# Patient Record
Sex: FEMALE | Race: WHITE | NOT HISPANIC OR LATINO | URBAN - METROPOLITAN AREA
[De-identification: names, ages, dates, MRNs, and addresses within clinical notes are randomized per-mention and may not be internally consistent; named-entity substitution may affect disease eponyms.]

---

## 2023-01-28 ENCOUNTER — OFFICE VISIT (OUTPATIENT)
Dept: URGENT CARE | Facility: CLINIC | Age: 48
End: 2023-01-28
Payer: COMMERCIAL

## 2023-01-28 VITALS
BODY MASS INDEX: 45 KG/M2 | OXYGEN SATURATION: 96 % | WEIGHT: 280 LBS | TEMPERATURE: 98 F | HEART RATE: 83 BPM | HEIGHT: 66 IN | DIASTOLIC BLOOD PRESSURE: 80 MMHG | SYSTOLIC BLOOD PRESSURE: 148 MMHG | RESPIRATION RATE: 16 BRPM

## 2023-01-28 DIAGNOSIS — S93.492A SPRAIN OF ANTERIOR TALOFIBULAR LIGAMENT OF LEFT ANKLE, INITIAL ENCOUNTER: ICD-10-CM

## 2023-01-28 DIAGNOSIS — M25.572 ACUTE LEFT ANKLE PAIN: Primary | ICD-10-CM

## 2023-01-28 PROCEDURE — 73610 X-RAY EXAM OF ANKLE: CPT | Mod: LT,S$GLB,, | Performed by: RADIOLOGY

## 2023-01-28 PROCEDURE — 99203 PR OFFICE/OUTPT VISIT, NEW, LEVL III, 30-44 MIN: ICD-10-PCS | Mod: S$GLB,,, | Performed by: NURSE PRACTITIONER

## 2023-01-28 PROCEDURE — 3008F BODY MASS INDEX DOCD: CPT | Mod: CPTII,S$GLB,, | Performed by: NURSE PRACTITIONER

## 2023-01-28 PROCEDURE — 1159F MED LIST DOCD IN RCRD: CPT | Mod: CPTII,S$GLB,, | Performed by: NURSE PRACTITIONER

## 2023-01-28 PROCEDURE — 99203 OFFICE O/P NEW LOW 30 MIN: CPT | Mod: S$GLB,,, | Performed by: NURSE PRACTITIONER

## 2023-01-28 PROCEDURE — 1160F PR REVIEW ALL MEDS BY PRESCRIBER/CLIN PHARMACIST DOCUMENTED: ICD-10-PCS | Mod: CPTII,S$GLB,, | Performed by: NURSE PRACTITIONER

## 2023-01-28 PROCEDURE — 1160F RVW MEDS BY RX/DR IN RCRD: CPT | Mod: CPTII,S$GLB,, | Performed by: NURSE PRACTITIONER

## 2023-01-28 PROCEDURE — 3008F PR BODY MASS INDEX (BMI) DOCUMENTED: ICD-10-PCS | Mod: CPTII,S$GLB,, | Performed by: NURSE PRACTITIONER

## 2023-01-28 PROCEDURE — 1159F PR MEDICATION LIST DOCUMENTED IN MEDICAL RECORD: ICD-10-PCS | Mod: CPTII,S$GLB,, | Performed by: NURSE PRACTITIONER

## 2023-01-28 PROCEDURE — 73610 XR ANKLE COMPLETE 3 VIEW LEFT: ICD-10-PCS | Mod: LT,S$GLB,, | Performed by: RADIOLOGY

## 2023-01-28 RX ORDER — GABAPENTIN 300 MG/1
CAPSULE ORAL
COMMUNITY

## 2023-01-28 RX ORDER — DICLOFENAC SODIUM 75 MG/1
TABLET, DELAYED RELEASE ORAL
COMMUNITY

## 2023-01-28 RX ORDER — CYCLOBENZAPRINE HCL 10 MG
TABLET ORAL
COMMUNITY

## 2023-01-28 RX ORDER — MONTELUKAST SODIUM 10 MG/1
10 TABLET ORAL
COMMUNITY
Start: 2023-01-16

## 2023-01-28 RX ORDER — AZELASTINE 1 MG/ML
SPRAY, METERED NASAL
COMMUNITY
Start: 2022-10-08

## 2023-01-28 RX ORDER — DICLOFENAC POTASSIUM 50 MG/1
TABLET, FILM COATED ORAL
COMMUNITY

## 2023-01-28 RX ORDER — FLUTICASONE PROPIONATE AND SALMETEROL XINAFOATE 230; 21 UG/1; UG/1
2 AEROSOL, METERED RESPIRATORY (INHALATION) 2 TIMES DAILY
COMMUNITY
Start: 2023-01-14

## 2023-01-28 RX ORDER — ALBUTEROL SULFATE 90 UG/1
AEROSOL, METERED RESPIRATORY (INHALATION)
COMMUNITY
Start: 2023-01-16

## 2023-01-28 RX ORDER — MEPOLIZUMAB 100 MG/ML
INJECTION, SOLUTION SUBCUTANEOUS
COMMUNITY

## 2023-01-28 NOTE — PROGRESS NOTES
"Subjective:       Patient ID: Barby Ro is a 47 y.o. female.    Vitals:  height is 5' 6" (1.676 m) and weight is 127 kg (280 lb). Her temperature is 97.8 °F (36.6 °C). Her pulse is 83. Her respiration is 16 and oxygen saturation is 96%.     Chief Complaint: Ankle Pain    Pt hit a pothole on the streets last night and twisted her left ankle pt has taken ibuprofen for pain   Provider note begins below    Patient was walking yesterday in tennis shoes.  She twisted her ankle.  She can not bear weight on her left foot.  Reports a history of a fracture in the right inguinal in high school.  She is set to go on a cruise tomorrow.    Ankle Pain   The incident occurred 12 to 24 hours ago. The incident occurred in the street. The injury mechanism was a twisting injury. The pain is present in the left ankle. Quality: throbbing pain. The pain is at a severity of 9/10. The pain is moderate. The pain has been Constant since onset. Associated symptoms include an inability to bear weight, a loss of motion, muscle weakness and tingling. Pertinent negatives include no loss of sensation or numbness. She reports no foreign bodies present. Nothing aggravates the symptoms. She has tried nothing for the symptoms.     Neurological:  Negative for numbness.     Objective:      Physical Exam   Constitutional: She is oriented to person, place, and time.   HENT:   Head: Normocephalic and atraumatic.   Cardiovascular: Normal rate.   Pulmonary/Chest: Effort normal. No respiratory distress.   Abdominal: Normal appearance.   Musculoskeletal:      Left ankle: She exhibits decreased range of motion, swelling and ecchymosis. Tenderness. Lateral malleolus tenderness found.   Neurological: She is alert and oriented to person, place, and time.   Skin: Skin is warm and dry.   Psychiatric: Her behavior is normal. Mood normal.         XR ANKLE COMPLETE 3 VIEW LEFT    Result Date: 1/28/2023  EXAMINATION: XR ANKLE COMPLETE 3 VIEW LEFT CLINICAL HISTORY: " Pain in left ankle and joints of left foot TECHNIQUE: AP, lateral and oblique views of the left ankle were performed. FINDINGS: The ankle joint space is satisfactorily preserved.  There is no fracture, dislocation, or bony erosion.  There is mild osseous spur at the Achilles insertion upon the calcaneus.     As above. Electronically signed by: Justin Ledesma MD Date:    01/28/2023 Time:    12:01    Assessment:       1. Acute left ankle pain    2. Sprain of anterior talofibular ligament of left ankle, initial encounter          Plan:       Ice, elevate, Ace wrap, ibuprofen  No fracture noted on x-ray   Patient has ibuprofen that she will take.  Declines medication.  Pt requesting a walking boot  Rest      Acute left ankle pain  -     XR ANKLE COMPLETE 3 VIEW LEFT; Future; Expected date: 01/28/2023  -     NON-PNEUMATIC WALKING BOOT FOR HOME USE    Sprain of anterior talofibular ligament of left ankle, initial encounter  -     NON-PNEUMATIC WALKING BOOT FOR HOME USE